# Patient Record
Sex: MALE | Race: WHITE | NOT HISPANIC OR LATINO | Employment: FULL TIME | URBAN - METROPOLITAN AREA
[De-identification: names, ages, dates, MRNs, and addresses within clinical notes are randomized per-mention and may not be internally consistent; named-entity substitution may affect disease eponyms.]

---

## 2017-03-03 ENCOUNTER — HOSPITAL ENCOUNTER (OUTPATIENT)
Dept: ONCOLOGY | Facility: CLINIC | Age: 62
Discharge: HOME | End: 2017-03-03
Attending: INTERNAL MEDICINE | Admitting: INTERNAL MEDICINE

## 2017-04-14 ENCOUNTER — OFFICE (AMBULATORY)
Dept: URBAN - METROPOLITAN AREA CLINIC 64 | Facility: CLINIC | Age: 62
End: 2017-04-14
Payer: COMMERCIAL

## 2017-04-14 ENCOUNTER — ON CAMPUS - OUTPATIENT (AMBULATORY)
Dept: URBAN - METROPOLITAN AREA HOSPITAL 2 | Facility: HOSPITAL | Age: 62
End: 2017-04-14
Payer: COMMERCIAL

## 2017-04-14 VITALS
DIASTOLIC BLOOD PRESSURE: 75 MMHG | OXYGEN SATURATION: 99 % | WEIGHT: 203 LBS | HEART RATE: 72 BPM | SYSTOLIC BLOOD PRESSURE: 135 MMHG | DIASTOLIC BLOOD PRESSURE: 88 MMHG | DIASTOLIC BLOOD PRESSURE: 106 MMHG | OXYGEN SATURATION: 100 % | DIASTOLIC BLOOD PRESSURE: 76 MMHG | HEIGHT: 70 IN | DIASTOLIC BLOOD PRESSURE: 79 MMHG | SYSTOLIC BLOOD PRESSURE: 139 MMHG | SYSTOLIC BLOOD PRESSURE: 130 MMHG | HEART RATE: 78 BPM | DIASTOLIC BLOOD PRESSURE: 66 MMHG | RESPIRATION RATE: 16 BRPM | DIASTOLIC BLOOD PRESSURE: 87 MMHG | HEART RATE: 65 BPM | OXYGEN SATURATION: 98 % | HEART RATE: 81 BPM | TEMPERATURE: 97.5 F | SYSTOLIC BLOOD PRESSURE: 159 MMHG | HEART RATE: 67 BPM | DIASTOLIC BLOOD PRESSURE: 85 MMHG | SYSTOLIC BLOOD PRESSURE: 122 MMHG | DIASTOLIC BLOOD PRESSURE: 73 MMHG | HEART RATE: 80 BPM | RESPIRATION RATE: 18 BRPM | SYSTOLIC BLOOD PRESSURE: 128 MMHG | HEART RATE: 70 BPM | HEART RATE: 68 BPM | SYSTOLIC BLOOD PRESSURE: 168 MMHG

## 2017-04-14 DIAGNOSIS — K62.1 RECTAL POLYP: ICD-10-CM

## 2017-04-14 DIAGNOSIS — D50.9 IRON DEFICIENCY ANEMIA, UNSPECIFIED: ICD-10-CM

## 2017-04-14 DIAGNOSIS — D12.3 BENIGN NEOPLASM OF TRANSVERSE COLON: ICD-10-CM

## 2017-04-14 DIAGNOSIS — K64.8 OTHER HEMORRHOIDS: ICD-10-CM

## 2017-04-14 DIAGNOSIS — K57.30 DIVERTICULOSIS OF LARGE INTESTINE WITHOUT PERFORATION OR ABS: ICD-10-CM

## 2017-04-14 DIAGNOSIS — K20.9 ESOPHAGITIS, UNSPECIFIED: ICD-10-CM

## 2017-04-14 LAB
GI HISTOLOGY: A. UNSPECIFIED: (no result)
GI HISTOLOGY: B. UNSPECIFIED: (no result)
GI HISTOLOGY: C. UNSPECIFIED: (no result)
GI HISTOLOGY: PDF REPORT: (no result)

## 2017-04-14 PROCEDURE — 43239 EGD BIOPSY SINGLE/MULTIPLE: CPT | Performed by: INTERNAL MEDICINE

## 2017-04-14 PROCEDURE — 45385 COLONOSCOPY W/LESION REMOVAL: CPT | Performed by: INTERNAL MEDICINE

## 2017-04-14 PROCEDURE — 88305 TISSUE EXAM BY PATHOLOGIST: CPT | Performed by: INTERNAL MEDICINE

## 2017-04-14 RX ORDER — OMEPRAZOLE 20 MG/1
20 CAPSULE, DELAYED RELEASE ORAL
Qty: 30 | Refills: 11 | Status: COMPLETED
Start: 2017-04-14 | End: 2020-03-20

## 2017-04-14 RX ADMIN — PROPOFOL: 10 INJECTION, EMULSION INTRAVENOUS at 16:15

## 2017-09-01 ENCOUNTER — HOSPITAL ENCOUNTER (OUTPATIENT)
Dept: ONCOLOGY | Facility: HOSPITAL | Age: 62
Discharge: HOME | End: 2017-09-01
Attending: INTERNAL MEDICINE | Admitting: INTERNAL MEDICINE

## 2017-09-01 ENCOUNTER — CLINICAL SUPPORT (OUTPATIENT)
Dept: ONCOLOGY | Facility: HOSPITAL | Age: 62
End: 2017-09-01

## 2017-09-01 ENCOUNTER — HOSPITAL ENCOUNTER (OUTPATIENT)
Dept: ONCOLOGY | Facility: CLINIC | Age: 62
Setting detail: INFUSION SERIES
Discharge: HOME | End: 2017-09-01
Attending: INTERNAL MEDICINE | Admitting: INTERNAL MEDICINE

## 2017-09-01 NOTE — PROGRESS NOTES
PATIENTS ONCOLOGY RECORD LOCATED IN [facility]      Subjective     Name:  REBECA BARDALES     Date:  2017  Address:  [address] [place] [address] [place] [address]  Home: [phone]  :  1955 AGE:  62 y.o.        RECORDS OBTAINED:  Patients Oncology Record is located in [facility]

## 2018-03-02 ENCOUNTER — HOSPITAL ENCOUNTER (OUTPATIENT)
Dept: ONCOLOGY | Facility: CLINIC | Age: 63
Setting detail: INFUSION SERIES
Discharge: HOME | End: 2018-03-02
Attending: INTERNAL MEDICINE | Admitting: INTERNAL MEDICINE

## 2018-03-02 ENCOUNTER — CLINICAL SUPPORT (OUTPATIENT)
Dept: ONCOLOGY | Facility: HOSPITAL | Age: 63
End: 2018-03-02

## 2018-03-02 NOTE — PROGRESS NOTES
PATIENTS ONCOLOGY RECORD LOCATED IN [facility]      Subjective     Name:  REBECA BARDALES     Date:  2018  Address:  [address] [place] [address] [place] [address]  Home: [phone]  :  1955 AGE:  62 y.o.        RECORDS OBTAINED:  Patients Oncology Record is located in [facility]

## 2018-09-07 ENCOUNTER — HOSPITAL ENCOUNTER (OUTPATIENT)
Dept: ONCOLOGY | Facility: CLINIC | Age: 63
Setting detail: INFUSION SERIES
Discharge: HOME | End: 2018-09-07
Attending: INTERNAL MEDICINE | Admitting: INTERNAL MEDICINE

## 2018-09-07 ENCOUNTER — CLINICAL SUPPORT (OUTPATIENT)
Dept: ONCOLOGY | Facility: HOSPITAL | Age: 63
End: 2018-09-07

## 2018-09-07 ENCOUNTER — HOSPITAL ENCOUNTER (OUTPATIENT)
Dept: ONCOLOGY | Facility: HOSPITAL | Age: 63
Discharge: HOME | End: 2018-09-07
Attending: INTERNAL MEDICINE | Admitting: INTERNAL MEDICINE

## 2018-09-07 LAB
IRON SATN MFR SERPL: 19 % (ref 20–50)
IRON SERPL-MCNC: 61 UG/DL (ref 45–182)
TIBC SERPL-MCNC: 313 UG/DL (ref 228–428)

## 2018-09-07 NOTE — PROGRESS NOTES
PATIENTS ONCOLOGY RECORD LOCATED IN [facility]      Subjective     Name:  REBECA BARDALES     Date:  2018  Address:  [address] [place] [address] [place] [address]  Home: [phone]  :  1955 AGE:  63 y.o.        RECORDS OBTAINED:  Patients Oncology Record is located in [facility]

## 2019-01-08 ENCOUNTER — HOSPITAL ENCOUNTER (OUTPATIENT)
Dept: LAB | Facility: HOSPITAL | Age: 64
Discharge: HOME | End: 2019-01-08
Attending: INTERNAL MEDICINE | Admitting: INTERNAL MEDICINE

## 2019-01-08 LAB
ALBUMIN SERPL-MCNC: 3.9 G/DL (ref 3.5–4.8)
ALBUMIN/GLOB SERPL: 1.9 {RATIO} (ref 1–1.7)
ALP SERPL-CCNC: 61 IU/L (ref 32–91)
ALT SERPL-CCNC: 30 IU/L (ref 17–63)
ANION GAP SERPL CALC-SCNC: 13.6 MMOL/L (ref 10–20)
AST SERPL-CCNC: 32 IU/L (ref 15–41)
BASOPHILS # BLD AUTO: 0 10*3/UL (ref 0–0.2)
BASOPHILS NFR BLD AUTO: 1 % (ref 0–2)
BILIRUB SERPL-MCNC: 0.9 MG/DL (ref 0.3–1.2)
BILIRUB UR QL STRIP: NEGATIVE MG/DL
BUN SERPL-MCNC: 8 MG/DL (ref 8–20)
BUN/CREAT SERPL: 10 (ref 6.2–20.3)
CALCIUM SERPL-MCNC: 9.1 MG/DL (ref 8.9–10.3)
CASTS URNS QL MICRO: NORMAL /[LPF]
CHLORIDE SERPL-SCNC: 100 MMOL/L (ref 101–111)
COLOR UR: YELLOW
CONV BACTERIA IN URINE MICRO: NEGATIVE
CONV CLARITY OF URINE: CLEAR
CONV CO2: 26 MMOL/L (ref 22–32)
CONV HYALINE CASTS IN URINE MICRO: 0 /[LPF] (ref 0–5)
CONV PROTEIN IN URINE BY AUTOMATED TEST STRIP: NEGATIVE MG/DL
CONV SMALL ROUND CELLS: NORMAL /[HPF]
CONV TOTAL PROTEIN: 6 G/DL (ref 6.1–7.9)
CONV UROBILINOGEN IN URINE BY AUTOMATED TEST STRIP: 0.2 MG/DL
CREAT UR-MCNC: 0.8 MG/DL (ref 0.7–1.2)
CRP SERPL-MCNC: 0.35 MG/DL (ref 0–0.7)
CULTURE INDICATED?: NORMAL
DIFFERENTIAL METHOD BLD: (no result)
EOSINOPHIL # BLD AUTO: 0.1 10*3/UL (ref 0–0.3)
EOSINOPHIL # BLD AUTO: 3 % (ref 0–3)
ERYTHROCYTE [DISTWIDTH] IN BLOOD BY AUTOMATED COUNT: 14.7 % (ref 11.5–14.5)
ERYTHROCYTE [SEDIMENTATION RATE] IN BLOOD BY WESTERGREN METHOD: 7 MM/HR (ref 0–20)
GLOBULIN UR ELPH-MCNC: 2.1 G/DL (ref 2.5–3.8)
GLUCOSE SERPL-MCNC: 99 MG/DL (ref 65–99)
GLUCOSE UR QL: NEGATIVE MG/DL
HAV IGM SERPL QL IA: NONREACTIVE
HBV CORE IGM SERPL QL IA: NONREACTIVE
HBV SURFACE AG SERPL QL IA: NONREACTIVE
HCT VFR BLD AUTO: 42.2 % (ref 40–54)
HCV AB SER DONR QL: NORMAL
HCV AB SER DONR QL: NORMAL
HGB BLD-MCNC: 14.3 G/DL (ref 14–18)
HGB UR QL STRIP: NEGATIVE
KETONES UR QL STRIP: NEGATIVE MG/DL
LEUKOCYTE ESTERASE UR QL STRIP: NEGATIVE
LYMPHOCYTES # BLD AUTO: 0.6 10*3/UL (ref 0.8–4.8)
LYMPHOCYTES NFR BLD AUTO: 19 % (ref 18–42)
MCH RBC QN AUTO: 34.2 PG (ref 26–32)
MCHC RBC AUTO-ENTMCNC: 33.8 G/DL (ref 32–36)
MCV RBC AUTO: 101.1 FL (ref 80–94)
MONOCYTES # BLD AUTO: 0.3 10*3/UL (ref 0.1–1.3)
MONOCYTES NFR BLD AUTO: 9 % (ref 2–11)
NEUTROPHILS # BLD AUTO: 2.2 10*3/UL (ref 2.3–8.6)
NEUTROPHILS NFR BLD AUTO: 68 % (ref 50–75)
NITRITE UR QL STRIP: NEGATIVE
NRBC BLD AUTO-RTO: 0 /100{WBCS}
NRBC/RBC NFR BLD MANUAL: 0 10*3/UL
PH UR STRIP.AUTO: 7 [PH] (ref 4.5–8)
PLATELET # BLD AUTO: 147 10*3/UL (ref 150–450)
PMV BLD AUTO: 10 FL (ref 7.4–10.4)
POTASSIUM SERPL-SCNC: 4.6 MMOL/L (ref 3.6–5.1)
RBC # BLD AUTO: 4.17 10*6/UL (ref 4.6–6)
RBC #/AREA URNS HPF: 0 /[HPF] (ref 0–3)
SODIUM SERPL-SCNC: 135 MMOL/L (ref 136–144)
SP GR UR: 1.02 (ref 1–1.03)
SPERM URNS QL MICRO: NORMAL /[HPF]
SQUAMOUS SPT QL MICRO: 0 /[HPF] (ref 0–5)
UNIDENT CRYS URNS QL MICRO: NORMAL /[HPF]
WBC # BLD AUTO: 3.2 10*3/UL (ref 4.5–11.5)
WBC #/AREA URNS HPF: 0 /[HPF] (ref 0–5)
YEAST SPEC QL WET PREP: NORMAL /[HPF]

## 2019-01-09 LAB
CONV HIV-1/ HIV-2: NORMAL
CONV HIV-1/ HIV-2: NORMAL

## 2019-01-13 LAB
INR PPP: 1
PROTHROMBIN TIME: 10.1 SEC (ref 9.6–11.7)
SCREEN DRVVT: 32 SEC

## 2019-09-06 ENCOUNTER — OFFICE VISIT (OUTPATIENT)
Dept: ONCOLOGY | Facility: CLINIC | Age: 64
End: 2019-09-06

## 2019-09-06 ENCOUNTER — TELEPHONE (OUTPATIENT)
Dept: ONCOLOGY | Facility: CLINIC | Age: 64
End: 2019-09-06

## 2019-09-06 ENCOUNTER — APPOINTMENT (OUTPATIENT)
Dept: LAB | Facility: HOSPITAL | Age: 64
End: 2019-09-06

## 2019-09-06 VITALS
RESPIRATION RATE: 18 BRPM | SYSTOLIC BLOOD PRESSURE: 155 MMHG | WEIGHT: 210.4 LBS | HEIGHT: 70 IN | DIASTOLIC BLOOD PRESSURE: 85 MMHG | TEMPERATURE: 98.3 F | HEART RATE: 73 BPM | BODY MASS INDEX: 30.12 KG/M2

## 2019-09-06 DIAGNOSIS — D72.819 LEUKOPENIA, UNSPECIFIED TYPE: ICD-10-CM

## 2019-09-06 DIAGNOSIS — Q99.8: ICD-10-CM

## 2019-09-06 DIAGNOSIS — E53.8 LOW VITAMIN B12 LEVEL: ICD-10-CM

## 2019-09-06 DIAGNOSIS — E61.1 IRON DEFICIENCY: Primary | ICD-10-CM

## 2019-09-06 PROBLEM — M32.9 SLE (SYSTEMIC LUPUS ERYTHEMATOSUS) (HCC): Status: ACTIVE | Noted: 2019-01-08

## 2019-09-06 PROBLEM — F10.11 HISTORY OF ALCOHOL ABUSE: Status: ACTIVE | Noted: 2019-09-06

## 2019-09-06 PROBLEM — R53.83 FATIGUE: Status: ACTIVE | Noted: 2019-01-08

## 2019-09-06 PROBLEM — E78.5 HYPERLIPIDEMIA: Status: ACTIVE | Noted: 2019-09-06

## 2019-09-06 PROBLEM — M35.9 CONNECTIVE TISSUE DISORDER (HCC): Status: ACTIVE | Noted: 2019-09-06

## 2019-09-06 PROBLEM — D75.89 MACROCYTOSIS: Status: ACTIVE | Noted: 2019-09-06

## 2019-09-06 PROBLEM — R76.8 ELEVATED ANTINUCLEAR ANTIBODY (ANA) LEVEL: Status: ACTIVE | Noted: 2019-01-08

## 2019-09-06 PROBLEM — Z23 NEED FOR PROPHYLACTIC VACCINATION AGAINST STREPTOCOCCUS PNEUMONIAE (PNEUMOCOCCUS): Status: ACTIVE | Noted: 2019-01-08

## 2019-09-06 PROBLEM — R35.0 FREQUENCY OF URINATION: Status: ACTIVE | Noted: 2019-01-08

## 2019-09-06 PROBLEM — L71.9 ACNE ROSACEA: Status: ACTIVE | Noted: 2019-09-06

## 2019-09-06 PROBLEM — F41.9 ANXIETY DISORDER: Status: ACTIVE | Noted: 2019-09-06

## 2019-09-06 LAB
BASOPHILS # BLD AUTO: 0.01 10*3/MM3 (ref 0–0.2)
BASOPHILS NFR BLD AUTO: 0.2 % (ref 0–1.5)
DEPRECATED RDW RBC AUTO: 46.2 FL (ref 37–54)
EOSINOPHIL # BLD AUTO: 0.07 10*3/MM3 (ref 0–0.4)
EOSINOPHIL NFR BLD AUTO: 1.6 % (ref 0.3–6.2)
ERYTHROCYTE [DISTWIDTH] IN BLOOD BY AUTOMATED COUNT: 13.1 % (ref 12.3–15.4)
FERRITIN SERPL-MCNC: 145 NG/ML (ref 24–336)
HCT VFR BLD AUTO: 40.3 % (ref 37.5–51)
HGB BLD-MCNC: 14.1 G/DL (ref 13–17.7)
IRON 24H UR-MRATE: 84 MCG/DL (ref 45–182)
IRON SATN MFR SERPL: 26 % (ref 20–50)
LYMPHOCYTES # BLD AUTO: 0.57 10*3/MM3 (ref 0.7–3.1)
LYMPHOCYTES NFR BLD AUTO: 13.3 % (ref 19.6–45.3)
MCH RBC QN AUTO: 34.4 PG (ref 26.6–33)
MCHC RBC AUTO-ENTMCNC: 35 G/DL (ref 31.5–35.7)
MCV RBC AUTO: 98.3 FL (ref 79–97)
MONOCYTES # BLD AUTO: 0.45 10*3/MM3 (ref 0.1–0.9)
MONOCYTES NFR BLD AUTO: 10.5 % (ref 5–12)
NEUTROPHILS # BLD AUTO: 3.18 10*3/MM3 (ref 1.7–7)
NEUTROPHILS NFR BLD AUTO: 74.4 % (ref 42.7–76)
PLATELET # BLD AUTO: 163 10*3/MM3 (ref 140–450)
PMV BLD AUTO: 11.7 FL (ref 6–12)
RBC # BLD AUTO: 4.1 10*6/MM3 (ref 4.14–5.8)
TIBC SERPL-MCNC: 328 MCG/DL (ref 228–428)
TRANSFERRIN SERPL-MCNC: 234 MG/DL (ref 200–360)
WBC NRBC COR # BLD: 4.28 10*3/MM3 (ref 3.4–10.8)

## 2019-09-06 PROCEDURE — 85025 COMPLETE CBC W/AUTO DIFF WBC: CPT | Performed by: INTERNAL MEDICINE

## 2019-09-06 PROCEDURE — 83540 ASSAY OF IRON: CPT | Performed by: INTERNAL MEDICINE

## 2019-09-06 PROCEDURE — 36415 COLL VENOUS BLD VENIPUNCTURE: CPT | Performed by: INTERNAL MEDICINE

## 2019-09-06 PROCEDURE — 99214 OFFICE O/P EST MOD 30 MIN: CPT | Performed by: INTERNAL MEDICINE

## 2019-09-06 PROCEDURE — 82728 ASSAY OF FERRITIN: CPT | Performed by: INTERNAL MEDICINE

## 2019-09-06 PROCEDURE — 84466 ASSAY OF TRANSFERRIN: CPT | Performed by: INTERNAL MEDICINE

## 2019-09-06 RX ORDER — MINOCYCLINE HYDROCHLORIDE 50 MG/1
50 CAPSULE ORAL 2 TIMES DAILY
COMMUNITY

## 2019-09-06 RX ORDER — LEVOTHYROXINE SODIUM 0.03 MG/1
TABLET ORAL
COMMUNITY
Start: 2019-09-01

## 2019-09-06 RX ORDER — HYDROCHLOROTHIAZIDE 25 MG/1
25 TABLET ORAL DAILY
COMMUNITY

## 2019-09-06 RX ORDER — ESCITALOPRAM OXALATE 20 MG/1
TABLET ORAL
COMMUNITY
Start: 2019-07-20

## 2019-09-06 NOTE — PROGRESS NOTES
HEMATOLOGY ONCOLOGY FOLLOW UP        Patient name: Maximo Mccarthy  : 1955  MRN: [id number]  Primary Care Physician: Eden Hyatt MD  Referring Physician: Eden Hyatt,*  Reason For Consult:     Chief Complaint   Patient presents with   • Follow-up     leukopenia       History of Present Illness:  Mr. Mccarthy is a pleasant 63-year-old male, with a history of acne rosacea, underwent lab work at Dr. Van’s office on 11 which showed leukopenia with a WBC of 3.6, ANC was 2,400.  He also had slight anemia, with hemoglobin 13.6, and increased MCV of 100.1 on the same time.  On 4/3/12 Dr. Van repeated blood work and which again showed leukopenia with WBC 3.6, hemoglobin 13, and MCV 98.6.  His platelets were also slightly low at 145,000.  Patient is completely asymptomatic and denied any history of frequent infections.  The patient does report having a history of low white blood cell counts back in 1970’s when he had blood work.  He had not had any blood work in the past few years prior to  since he had not been seeing physicians for a long time.     He had been started on Minocycline to treat his acne rosacea in .  In addition, he also admits to drinking several beers (up to 20 cans a day) on weekends.  He denies any liver problems.  Overall he felt great.  • 12 - WBC 4.1, hemoglobin 13.8, MCV 99.3, platelet count 155,000.  Serum ferritin 213, iron saturation 26%, TIBC 350, serum iron 92, vitamin B12 325, , folate 19.4, haptoglobin (N), LFTs (N), LDH (N).  WILL screen positive.  RNP antibody 1.0 positive.   DRVVT screen negative.  • 12 - WBC 3.3, hemoglobin 13.5, MCV 98.9, platelet count 144,000.  • 12 - Antiphospholipid antibody testing negative.   • 12 - WBC 3.8, hemoglobin 13.0, MCV 99.3, platelet count 171,000. This is in a blue top tube.  TSH [N], vitamin B12 333.  • 12 - Bone marrow biopsy:  normal bone marrow with adequate  iron stores.  Flow cytometry negative.  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  20q is a common cytogenetic feature of myelodysplastic syndrome and other myeloid neoplasms.  In MDS this is associated with a favorable prognosis.    • 8/21/12 - WBC 3.7, hemoglobin 14.1, platelet count 167,000.  Complement C3 107 [N].  Complement C4 27 [N].  C reactive protein < 0.1.   • 11/2/12 - WBC 3.5, hemoglobin 12.3, platelet count 155,000, MCV 98.   • 3/8/13 - WBC 3.1, hemoglobin 13.5, platelet count 132,000, MCV 98.9.  • 7/12/13 - WBC 2.9, ANC 1800, hemoglobin 13.1, MCV 99.5, platelet count 137,000.  WILL negative. CRP 0.14. CMP normal.   • 11/8/13 - White count 2.8, hemoglobin 13.1, platelet count 180,000, .4, ANC 1800.  Serum WILL positive.  RNP antibody also positive 1.0.  CRP is 0.19.    • 6/6/14 - White count 3.3, hemoglobin 13.3, .3, platelet count 166,000.   • 10/3/14 - WBC 3.3, hemoglobin 13.0, platelet count 171,000, .3, ANC 2200.   • 2/6/15 - WBC 3.6, hemoglobin 13.5, platelet count 172,000, MCV 99.7.    • August 2015 - Patient was started on B12 and iron supplement p.o. daily.  • 8/7/15 - WBC 3.8, hemoglobin 13.3, platelet count 203,000, MCV 98.9.  B12 294.  Ferritin 135.  Folate 22.7.  .  Iron saturation 21%, TIBC 311, serum iron 65.  • 2/26/16 - Iron saturation 14%, TIBC 342, serum iron 48.  Vitamin B12 529.  Ferritin 116.    • 9/2/16 - WBC 3.1, hemoglobin 13.3, platelet count 151,000, MCV 98.7.  Iron saturation 19% (L), TIBC 316, serum iron 59.  • 3/3/17 - WBC 3.5, hemoglobin 14.1., platelet count 142,000, .7.  • 9/1/17 - WBC 3.2, hemoglobin 13.5, platelet count 153,000, MCV 97.3.    • 3/2/18 - WBC 3.5, hemoglobin 13.7, platelet count 152,000, MCV 99.5.    • 9/7/18 - WBC 3.1, hemoglobin 13.5, platelet count 155,000, .3.  Iron saturation 19% low, serum iron 61, TIBC 313, ferritin 152  • 1/8/2019 HIV 1 and 2- negative, WBC 3.2 hemoglobin 14.3,  platelets 147, , hepatitis panel negative, ESR 7, CRP 0.35, DRV VT is normal  • 2/27/2019: Decision made to discontinue Plaquenil.   • 9/6/2019 WBC 4.2, hemoglobin 14.1, platelets 163, MCV 98.3      Subjective:09/06/19  Patient is here for a follow up appointment.  He has not been on Plaquenil for a long time.  He is feeling well.  Leukopenia has been stable.  Denies any fevers chills night sweats weight loss or lymph node swelling.  He has not been taking B12 supplements or any iron supplements.  He is seeing Dr. Lancaster for his positive WILL and was told he does not have any active lupus.  Patient states he has stopped drinking alcohol.  His wife has been diagnosed with a gynecological cancer and is receiving chemotherapy.          The following portions of the patient's history were reviewed and updated as appropriate: allergies, current medications, past family history, past medical history, past social history, past surgical history and problem list.         Past Medical History:   Diagnosis Date   • Anxiety    • Connective tissue disorder (CMS/HCC)    • Fatigue    • History of alcohol abuse    • Iron deficiency    • Leukopenia    • Macrocytosis    Positive WILL    Past Surgical History:   Procedure Laterality Date   • NASAL SEPTUM SURGERY  1997   • TONSILLECTOMY AND ADENOIDECTOMY           Current Outpatient Medications:   •  escitalopram (LEXAPRO) 20 MG tablet, , Disp: , Rfl:   •  hydrochlorothiazide (HYDRODIURIL) 25 MG tablet, Take 25 mg by mouth Daily., Disp: , Rfl:   •  levothyroxine (SYNTHROID, LEVOTHROID) 25 MCG tablet, , Disp: , Rfl:   •  minocycline (MINOCIN,DYNACIN) 50 MG capsule, Take 50 mg by mouth 2 (Two) Times a Day., Disp: , Rfl:     No Known Allergies    Family History   Problem Relation Age of Onset   • Lupus Brother        Cancer-related family history is not on file.    Social History     Tobacco Use   • Smoking status: Never Smoker   • Smokeless tobacco: Never Used   Substance Use Topics  "  • Alcohol use: Yes     Alcohol/week: 12.0 oz     Types: 20 Cans of beer per week     Comment: drinks 20 cans of beer a day on the weekends   • Drug use: No   Previously used to drink.  But he has stopped drinking.  .  He works at the [employer].  Lives with his wife and   stepdaughter.     I have reviewed the history of present illness, past medical history, family history, social history, lab results, all notes and other records since the patient was last seen on  Visit date not found.    ROS:     Review of Systems   Constitutional: Negative for chills and fever.   HENT: Negative for ear pain, mouth sores, nosebleeds and sore throat.    Eyes: Negative for photophobia and visual disturbance.   Respiratory: Negative for wheezing and stridor.    Cardiovascular: Negative for chest pain and palpitations.   Gastrointestinal: Negative for abdominal pain, diarrhea, nausea and vomiting.   Endocrine: Negative for cold intolerance and heat intolerance.   Genitourinary: Negative for dysuria and hematuria.   Musculoskeletal: Negative for joint swelling and neck stiffness.   Skin: Negative for color change and rash.   Neurological: Negative for seizures and syncope.   Hematological: Negative for adenopathy.        No obvious bleeding   Psychiatric/Behavioral: Negative for agitation, confusion and hallucinations.       Objective:    Vitals:    09/06/19 1105   BP: 155/85   Pulse: 73   Resp: 18   Temp: 98.3 °F (36.8 °C)   Weight: 95.4 kg (210 lb 6.4 oz)   Height: 177.8 cm (70\")   PainSc: 0-No pain     ECOG  (0) Fully active, able to carry on all predisease performance without restriction    Physical Exam:   Physical Exam   Constitutional: He is oriented to person, place, and time. He appears well-developed and well-nourished. No distress.   Obese   HENT:   Head: Normocephalic and atraumatic.   Redness of nose decreased   Eyes: Conjunctivae and EOM are normal. Right eye exhibits no discharge. Left eye exhibits no discharge. No " scleral icterus.   Neck: Normal range of motion. Neck supple. No thyromegaly present.   Cardiovascular: Normal rate, regular rhythm and normal heart sounds. Exam reveals no gallop and no friction rub.   Pulmonary/Chest: Effort normal. No stridor. No respiratory distress. He has no wheezes.   Abdominal: Soft. Bowel sounds are normal. He exhibits no mass. There is no tenderness. There is no rebound and no guarding.   Musculoskeletal: Normal range of motion. He exhibits no tenderness.   Lymphadenopathy:     He has no cervical adenopathy.   Neurological: He is alert and oriented to person, place, and time. He exhibits normal muscle tone.   Skin: Skin is warm. No rash noted. He is not diaphoretic. No erythema.   Psychiatric: He has a normal mood and affect. His behavior is normal.   Nursing note and vitals reviewed.            Lab Results - Last 18 Months   Lab Units 09/06/19  1202 01/08/19  1014   WBC 10*3/mm3 4.28 3.2*   HEMOGLOBIN g/dL 14.1 14.3   HEMATOCRIT % 40.3 42.2   PLATELETS 10*3/mm3 163 147*   MCV fL 98.3* 101.1*     Lab Results - Last 18 Months   Lab Units 01/08/19  1014   SODIUM mmol/L 135*   POTASSIUM mmol/L 4.6   CHLORIDE mmol/L 100*   TOTAL CO2 mmol/L 26   BUN mg/dL 8   CREATININE mg/dl 0.8   CALCIUM mg/dL 9.1   BILIRUBIN mg/dL 0.9   ALK PHOS IU/L 61   ALT (SGPT) IU/L 30   AST (SGOT) IU/L 32   GLUCOSE mg/dL 99       Lab Results   Component Value Date    GLUCOSE 99 01/08/2019    BUN 8 01/08/2019    CREATININE 0.8 01/08/2019    BCR 10.0 01/08/2019    K 4.6 01/08/2019    CO2 26 01/08/2019    CALCIUM 9.1 01/08/2019    ALBUMIN 3.9 01/08/2019    LABIL2 1.9 (H) 01/08/2019    AST 32 01/08/2019    ALT 30 01/08/2019       Lab Results - Last 18 Months   Lab Units 01/08/19  1014   INR  1.0       Lab Results   Component Value Date    IRON 61 09/07/2018    TIBC 313 09/07/2018       No results found for: FOLATE    No results found for: OCCULTBLD    No results found for: RETICCTPCT    No results found for: GHUBAFAP71  No  results found for: SPEP, UPEP  No results found for: LDH, URICACID  Lab Results   Component Value Date    SEDRATE 7 01/08/2019     No results found for: FIBRINOGEN, HAPTOGLOBIN  Lab Results   Component Value Date    INR 1.0 01/08/2019     No results found for:   No results found for: CEA  No components found for: CA-19-9  No results found for: PSA          Assessment/Plan     Assessment:  1. History of chronic leukopenia:  Previous bone marrow showed normal morphology and flow cytometry but abnormal karyotype with 20q deletion in 7 out of 20 metaphases. Low-grade myelodysplastic syndrome is therefore a  possibility due to the cytogenetic abnormality.  HIV testing was negative.  He is on Minocycline which could contribute to cytopenias. His white count has remained stable for several years.  His macrocytosis has remained stable as well.  WBC is 4.2 today.  2. Macrocytosis:  Low-grade/evolving MDS is being suspected, but his bone marrow function and macrocytosis continue to remain stable.     3. History of alcohol abuse:  He used to binge alcohol on weekends, however, he has stopped drinking in the recent past.   4. History of connective tissue disorder: Has a history of positive WILL.  He was on Plaquenil in the past.  He is not on it anymore.  5. Low-normal vitamin B12:  He has not been taking his B12 supplements regularly.    6. History of mild iron deficiency:  Colonoscopy in April 2017 showed polyps (hyperplastic polyp and tubular adenoma).  He was on oral iron.  7. History of acne rosacea: Symptoms have resolved.    PLAN:   1. Recommend to resume B12 supplements  2. Check ferritin and iron panel, B12 levels today.  Advised patient to call us back next week for results.  3. WBC is actually better today.  Follow-up with CBC in 1 year            Iron deficiency  - CBC Auto Differential  - Iron Profile  - Ferritin  - Vitamin B12  - CBC & Differential  - Iron Profile  - Ferritin  - Vitamin B12    Leukopenia,  unspecified type  - CBC Auto Differential  - Iron Profile  - Ferritin  - CBC & Differential  - Iron Profile  - Ferritin  - Vitamin B12    Orders Placed This Encounter   Procedures   • CBC Auto Differential     collect LABS TODAY     Scheduling Instructions:      collect LABS TODAY   • Iron Profile     Labs today     Scheduling Instructions:      Labs today   • Ferritin     Labs today     Scheduling Instructions:      Labs today   • Vitamin B12   • Iron Profile     Standing Status:   Future     Scheduling Instructions:      Please draw these labs prior to the next visit.   • Ferritin     Standing Status:   Future     Scheduling Instructions:      Please draw these labs prior to the next visit.   • Vitamin B12     Standing Status:   Future     Standing Expiration Date:   9/6/2020   • CBC & Differential     Please draw these labs prior to the next visit.     Standing Status:   Future     Scheduling Instructions:      Please draw these labs prior to the next visit.     Order Specific Question:   Manual Differential     Answer:   No                     Thank you very much for providing the opportunity to participate in this patient’s care. Please do not hesitate to call if there are any other questions.    I have reviewed all relevant labs results,outside reports, imaging,,notes, vitals, medications and plan with the patient today.    Portions of the note have been Scribed by medical assistant/Nurse.  I have reviewed and made changes accordingly.

## 2020-05-15 ENCOUNTER — OFFICE (AMBULATORY)
Dept: URBAN - METROPOLITAN AREA PATHOLOGY 4 | Facility: PATHOLOGY | Age: 65
End: 2020-05-15
Payer: COMMERCIAL

## 2020-05-15 ENCOUNTER — ON CAMPUS - OUTPATIENT (AMBULATORY)
Dept: URBAN - METROPOLITAN AREA HOSPITAL 2 | Facility: HOSPITAL | Age: 65
End: 2020-05-15
Payer: COMMERCIAL

## 2020-05-15 VITALS
OXYGEN SATURATION: 96 % | HEART RATE: 73 BPM | SYSTOLIC BLOOD PRESSURE: 178 MMHG | DIASTOLIC BLOOD PRESSURE: 62 MMHG | HEART RATE: 77 BPM | DIASTOLIC BLOOD PRESSURE: 77 MMHG | DIASTOLIC BLOOD PRESSURE: 99 MMHG | DIASTOLIC BLOOD PRESSURE: 74 MMHG | DIASTOLIC BLOOD PRESSURE: 91 MMHG | DIASTOLIC BLOOD PRESSURE: 88 MMHG | SYSTOLIC BLOOD PRESSURE: 128 MMHG | HEART RATE: 81 BPM | DIASTOLIC BLOOD PRESSURE: 102 MMHG | HEART RATE: 75 BPM | DIASTOLIC BLOOD PRESSURE: 79 MMHG | DIASTOLIC BLOOD PRESSURE: 71 MMHG | HEART RATE: 72 BPM | DIASTOLIC BLOOD PRESSURE: 75 MMHG | SYSTOLIC BLOOD PRESSURE: 143 MMHG | SYSTOLIC BLOOD PRESSURE: 89 MMHG | HEART RATE: 64 BPM | HEIGHT: 70 IN | RESPIRATION RATE: 16 BRPM | SYSTOLIC BLOOD PRESSURE: 163 MMHG | SYSTOLIC BLOOD PRESSURE: 130 MMHG | HEART RATE: 82 BPM | OXYGEN SATURATION: 100 % | HEART RATE: 85 BPM | TEMPERATURE: 97.6 F | SYSTOLIC BLOOD PRESSURE: 134 MMHG | RESPIRATION RATE: 18 BRPM | SYSTOLIC BLOOD PRESSURE: 170 MMHG | SYSTOLIC BLOOD PRESSURE: 139 MMHG | WEIGHT: 210 LBS | OXYGEN SATURATION: 98 % | SYSTOLIC BLOOD PRESSURE: 88 MMHG | HEART RATE: 80 BPM | SYSTOLIC BLOOD PRESSURE: 104 MMHG

## 2020-05-15 DIAGNOSIS — D12.5 BENIGN NEOPLASM OF SIGMOID COLON: ICD-10-CM

## 2020-05-15 DIAGNOSIS — K64.8 OTHER HEMORRHOIDS: ICD-10-CM

## 2020-05-15 DIAGNOSIS — K57.30 DIVERTICULOSIS OF LARGE INTESTINE WITHOUT PERFORATION OR ABS: ICD-10-CM

## 2020-05-15 DIAGNOSIS — Z86.010 PERSONAL HISTORY OF COLONIC POLYPS: ICD-10-CM

## 2020-05-15 PROBLEM — K63.5 POLYP OF COLON: Status: ACTIVE | Noted: 2020-05-15

## 2020-05-15 LAB
GI HISTOLOGY: A. UNSPECIFIED: (no result)
GI HISTOLOGY: PDF REPORT: (no result)

## 2020-05-15 PROCEDURE — 88305 TISSUE EXAM BY PATHOLOGIST: CPT | Performed by: INTERNAL MEDICINE

## 2020-05-15 PROCEDURE — 45385 COLONOSCOPY W/LESION REMOVAL: CPT | Mod: 33 | Performed by: INTERNAL MEDICINE

## 2020-09-11 ENCOUNTER — APPOINTMENT (OUTPATIENT)
Dept: LAB | Facility: HOSPITAL | Age: 65
End: 2020-09-11